# Patient Record
Sex: FEMALE | Race: WHITE | Employment: OTHER | URBAN - METROPOLITAN AREA
[De-identification: names, ages, dates, MRNs, and addresses within clinical notes are randomized per-mention and may not be internally consistent; named-entity substitution may affect disease eponyms.]

---

## 2017-01-05 ENCOUNTER — HOSPITAL ENCOUNTER (OUTPATIENT)
Dept: WOUND CARE | Age: 77
Discharge: HOME | End: 2017-01-05
Payer: MEDICARE

## 2017-01-05 PROCEDURE — 29581 APPL MULTLAYER CMPRN SYS LEG: CPT | Performed by: OTOLARYNGOLOGY

## 2017-01-05 NOTE — PROGRESS NOTES
Jewel [address] [place]   WOUND CARE PROGRESS NOTE       Name:  Emory Joy   MR#:  [id number]   :  1940   Account #:  [de-identified]        Date of Adm:  2017       DATE OF SERVICE:  2017    The patient comes for followup of a venous leg ulcer, I87.312. She has   done well with Aquacel AG and 3-layer wrap. On exam today, she is awake and alert. She is accompanied by a   grandson. The wound continues to improve and is 50% smaller than   last week. We will continue with the Aquacel AG and 3-layer wrap and   see her back in one week.          MD ANTONIO Brito / Landon Coe   D:  2017   16:00   T:  2017   16:20   Job #:  [id number]

## 2017-01-06 ENCOUNTER — OFFICE VISIT (OUTPATIENT)
Dept: NEUROLOGY | Age: 77
End: 2017-01-06

## 2017-01-06 VITALS — HEART RATE: 64 BPM

## 2017-01-06 DIAGNOSIS — G30.1 LATE ONSET ALZHEIMER'S DISEASE WITHOUT BEHAVIORAL DISTURBANCE (HCC): Primary | ICD-10-CM

## 2017-01-06 DIAGNOSIS — F32.A DEPRESSION, UNSPECIFIED DEPRESSION TYPE: ICD-10-CM

## 2017-01-06 DIAGNOSIS — F02.80 LATE ONSET ALZHEIMER'S DISEASE WITHOUT BEHAVIORAL DISTURBANCE (HCC): Primary | ICD-10-CM

## 2017-01-06 RX ORDER — ESCITALOPRAM OXALATE 20 MG/1
20 TABLET ORAL DAILY
Qty: 30 TAB | Refills: 5 | Status: SHIPPED | OUTPATIENT
Start: 2017-01-06 | End: 2017-01-06 | Stop reason: SDUPTHER

## 2017-01-06 RX ORDER — ESCITALOPRAM OXALATE 20 MG/1
TABLET ORAL
Qty: 90 TAB | Refills: 5 | Status: SHIPPED | OUTPATIENT
Start: 2017-01-06

## 2017-01-06 NOTE — MR AVS SNAPSHOT
Visit Information Date & Time Provider Department Dept. Phone Encounter #  
 1/6/2017  2:00 PM Nora Cooney NP [facility] [phone] [id number] Follow-up Instructions Return in about 6 months (around 7/6/2017). Upcoming Health Maintenance Date Due DTaP/Tdap/Td series (1 - Tdap) 12/11/1962 FOBT Q 1 YEAR AGE 50-75 12/11/1991 ZOSTER VACCINE AGE 60> 12/11/2001 GLAUCOMA SCREENING Q2Y 12/11/2006 OSTEOPOROSIS SCREENING (DEXA) 12/11/2006 Pneumococcal 65+ Low/Medium Risk (1 of 2 - PCV13) 12/11/2006 MEDICARE YEARLY EXAM 12/11/2006 INFLUENZA AGE 9 TO ADULT 8/1/2016 Allergies as of 1/6/2017  Review Complete On: 1/6/2017 By: Ling Lea LPN Severity Noted Reaction Type Reactions Eliquis [Apixaban] Medium 12/08/2016    Nausea Only Xarelto [Rivaroxaban] Medium 12/08/2016    Unknown (comments) Amoxicillin  04/11/2016    Other (comments) Current Immunizations  Never Reviewed No immunizations on file. Not reviewed this visit You Were Diagnosed With   
  
 Codes Comments Late onset Alzheimer's disease without behavioral disturbance    -  Primary ICD-10-CM: G30.1, F02.80 ICD-9-CM: 331.0, 294.10 Depression, unspecified depression type     ICD-10-CM: F32.9 ICD-9-CM: 648 Vitals BP Pulse Height(growth percentile) SpO2 OB Status Smoking Status (P) 102/70 64 (P) 5' 3\" (1.6 m) (P) 98% Postmenopausal Never Smoker Vitals History Preferred Pharmacy Pharmacy Name Phone [facility] [address], [address] [place] [address] [phone] Your Updated Medication List  
  
   
This list is accurate as of: 1/6/17  3:08 PM.  Always use your most recent med list.  
  
  
  
  
 atenolol 25 mg tablet Commonly known as:  TENORMIN Take 25 mg by mouth two (2) times a day. escitalopram oxalate 20 mg tablet Commonly known as:  Shan Barrs Take 1 Tab by mouth daily. mesalamine  mg Cpdr DR capsule Commonly known as:  DELZICOL Take  by mouth two (2) times a day. warfarin 5 mg tablet Commonly known as:  COUMADIN Take 5 mg by mouth daily. Prescriptions Sent to Pharmacy Refills  
 escitalopram oxalate (LEXAPRO) 20 mg tablet 5 Sig: Take 1 Tab by mouth daily. Class: Normal  
 Pharmacy: [facility] [address] [facility], [address] [place] [address] Leonor Chávez of [address] [place] [address] Ph #: [phone] Route: Oral  
  
Follow-up Instructions Return in about 6 months (around 7/6/2017). To-Do List   
 01/12/2017 3:15 PM  
  Appointment with Brian Dockery MD at [address] [place] [address]. ([phone]) [facility]! Dear Jose Watkins: Thank you for requesting a [employer] account. Our records indicate that you already have an active [employer] account. You can access your account anytime at [email or web address] Did you know that you can access your hospital and ER discharge instructions at any time in [facility]? You can also review all of your test results from your hospital stay or ER visit. Additional Information If you have questions, please visit the Frequently Asked Questions section of the [employer] website at [email or web address] Remember, [facility] is NOT to be used for urgent needs. For medical emergencies, dial 911. Now available from your iPhone and Android! Please provide this summary of care documentation to your next provider. Your primary care clinician is listed as PROVIDER UNKNOWN. If you have any questions after today's visit, please call [phone].

## 2017-01-06 NOTE — PROGRESS NOTES
Date:             2017    Name:  Jesus Logan  :  1940  MRN:  [id number]     PCP:  PROVIDER UNKNOWN    Chief Complaint   Patient presents with    Follow-up    Dementia     Dementia is worse. HISTORY OF PRESENT ILLNESS:  Derrick Lara is a 77 y.o., female who presents today for follow up for dementia. Daughter had noticed some time ago that her mother was having some memory changes. She had memory testing done in February, son was very concerned at that time and wanted her to be in a nursing home. She moved in with her daughter instead. Patient moved with her daughter to [place], started getting very angry and confused. Told her daughter that her head felt heavy and tangled. Patient moved back home to [place], had aids coming to the home. That didn't work for her because she did not do well with the aids. She is now back with her daughter in [place]. Diagnosis was initially mild dementia, daughter does feel that it has progressed. She sundowns, can be more confused and belligerent at night. Forgets who her daughter is in the mornings, but does well during the day. Daughter feels that she is much better now that she is back in her daughter's home, which she is familiar with. No safety concerns, does not wander, no kitchen safety issues. She does not drive. She has been having increasing trouble finding her words since the summer, daughter thinks that this has been a gradual onset. Has afib, well controlled on coumadin. Does have a history of for 5 or 6 years high cholesterol and has been not on meds for that, BP is well controlled with atenolol. Overall though, she and her daughter seem to be happy with the current arrangement and think that she is stable and safe. Her daughter manages medications, does help with with ADLs, has to remind her to get into the shower, has to make her lunch and leave it for her each day.       2016 recap  Derrick Lara is a 77 y.o. female who I am asked to see in consultation for memory loss. Pt reports that her daughter in law states she can't remember anything and can't go out alone. This was very upsetting to patient. Her daughter that is with her today noted this memory loss started a year ago. She had double hernia surgery a year ago. She had to do rehab at a nursing home and daughter noted that she wasn't the same since. Her PCP started her on aricept. No major changes. She is off aricept x 3 weeks. October last year she had a fall. She lives in [place]. She called her daugther and she flew out. Pt was okay. She has afib and she is on warfarin. She had a cards check and had some meds changed and this has gotten better. She went to see her son in [place] for a few weeks and her son and daughter in law were very concerned. They took her to a neurologist and confirmed dementia but patient didn't feel prepared for that visit. MMSE 27/30 there. Son is very intimidating. He is an oral surgeon and makes mom nervous. She has become even more OCD. [facility]. She is going to have help at home. She is doing all ADLs, cooking, cleaning, etc. She will have someone checking on her. She is only having issues with finances but they have it all autopay now. She has 15 girl friends there and aunt that lives in [place] to help her. She reports feeling less active. Daughter notes since being here she has recovered and livened up a bit. Pt notes she has been having some word finding difficulty. Other son lives in [facility]. Current Outpatient Prescriptions   Medication Sig    escitalopram oxalate (LEXAPRO) 10 mg tablet Take 10 mg by mouth daily.  atenolol (TENORMIN) 25 mg tablet Take 25 mg by mouth two (2) times a day.  warfarin (COUMADIN) 5 mg tablet Take 5 mg by mouth daily.  mesalamine DR (DELZICOL) 400 mg cpDR DR capsule Take  by mouth two (2) times a day.      No current facility-administered medications for this visit. Allergies   Allergen Reactions    Eliquis [Apixaban] Nausea Only    Xarelto [Rivaroxaban] Unknown (comments)    Amoxicillin Other (comments)     Past Medical History   Diagnosis Date    Breast cancer ([facility] [address]      left breast lumpectomy, 2014-no radiation or chemo per daughter Thi Palacios     Past Surgical History   Procedure Laterality Date    Hx gyn      Hx cholecystectomy       Social History     Social History    Marital status: UNKNOWN     Spouse name: N/A    Number of children: N/A    Years of education: N/A     Occupational History    Not on file. Social History Main Topics    Smoking status: Never Smoker    Smokeless tobacco: Not on file    Alcohol use 4.2 oz/week     7 Glasses of wine per week    Drug use: No    Sexual activity: Not on file     Other Topics Concern    Not on file     Social History Narrative     Family History   Problem Relation Age of Onset    Breast Cancer Sister 72         PHYSICAL EXAMINATION:    Visit Vitals    BP (P) 102/70    Pulse 64    Ht (P) 5' 3\" (1.6 m)    SpO2 (P) 98%     General:  Well defined, nourished, and groomed individual in no acute distress. Neck: Supple, nontender, no bruits, no pain with resistance to active range of motion. Heart: Irregular rate and rhythm, no murmurs, rub, or gallop. Normal S1S2. Lungs:  Clear to auscultation bilaterally with equal chest expansion, no cough, no wheeze  Musculoskeletal:  Extremities revealed no edema and had full range of motion of joints. Psych:  Good mood and bright affect    NEUROLOGICAL EXAMINATION:     Mental Status:    Alert. Oriented to self. Not oriented to year, season. Speech is garbled, patient has difficulty finding her words, loses train of thought easily. Cranial Nerves:    II, III, IV, VI:  Visual acuity grossly intact. Pupils are equal, round, and reactive to light. Extra-ocular movements are full and fluid. No ptosis or nystagmus. V-XII: Hearing is grossly intact. Facial features are symmetric, with normal sensation and strength. The palate rises symmetrically and the tongue protrudes midline. Sternocleidomastoids 5/5. Motor Examination: Normal tone, bulk, and strength, 5/5 muscle strength throughout. Coordination:  Finger to nose testing was normal.   No resting or intention tremor  Gait and Station:  Steady while walking and with tandem walking. Normal arm swing. No pronator drift. No muscle wasting or fasciculations noted. ASSESSMENT AND PLAN    ICD-10-CM ICD-9-CM    1. Late onset Alzheimer's disease without behavioral disturbance G30.1 331.0     F02.80 294.10    2. Depression, unspecified depression type F32.9 311 escitalopram oxalate (LEXAPRO) 20 mg tablet     64-year-old female seen in follow-up for dementia. She had neuropsych testing done in [place] earlier this year, unfortunate those results are not available. Her daughter reports that she was diagnosed with mild dementia at that time, does feel that she has progressed. She is living with her daughter. No current safety concerns, no wandering, no driving, no kitchen safety issues. Daughter assists with some ADLs, patient has to be reminded to get in the shower, daughter makes her lunch and leaves it for her daily, manages medications. She does tend to get very frustrated by memory loss, is more tearful or agitated in the evenings. Has extreme difficulty finding her words, this is been progressive since the summer. 1.  Patient did not tolerate Aricept or Namenda  2. Increase lexapro to 20 mg daily for depression, discussed that there are options available should she become increasingly agitated in the evenings but that none of these are really indicated at this time. Family will call if that is the case  3.   Encouraged patient's daughter to look into resources available in the Alzheimer's Association website, they are interested in  to keep her engaged during the day  4. Discussed that there are in-home options available when needed, daughter will call if she feels that they are needed more help in the home. Would consider encompass memory care assessment  5. Discussed possibility that patient has had a stroke with history of A. fib and relatively sudden onset of word finding difficulty, they declined an MRI, will continue to follow with cardiologist for management of A. fib.   Plan to establish care with a new PCP for management of other risk factors, patient should get cholesterol checked    Follow-up in 6 months, call sooner with concerns    40+ minutes, >50% discussing above/answering questions/formulating plan    Yaneth Bernstein NP

## 2017-01-12 ENCOUNTER — HOSPITAL ENCOUNTER (OUTPATIENT)
Dept: WOUND CARE | Age: 77
Discharge: HOME | End: 2017-01-12

## 2017-06-09 ENCOUNTER — APPOINTMENT (OUTPATIENT)
Dept: CT IMAGING | Age: 77
End: 2017-06-09
Attending: PHYSICIAN ASSISTANT
Payer: MEDICARE

## 2017-06-09 ENCOUNTER — HOSPITAL ENCOUNTER (EMERGENCY)
Age: 77
Discharge: HOME | End: 2017-06-09
Attending: EMERGENCY MEDICINE
Payer: MEDICARE

## 2017-06-09 VITALS
TEMPERATURE: 98.1 F | OXYGEN SATURATION: 98 % | HEIGHT: 63 IN | DIASTOLIC BLOOD PRESSURE: 89 MMHG | HEART RATE: 67 BPM | BODY MASS INDEX: 31.01 KG/M2 | RESPIRATION RATE: 18 BRPM | SYSTOLIC BLOOD PRESSURE: 142 MMHG | WEIGHT: 175 LBS

## 2017-06-09 DIAGNOSIS — R82.71 BACTERIURIA: Primary | ICD-10-CM

## 2017-06-09 LAB
ALBUMIN SERPL BCP-MCNC: 3.4 G/DL (ref 3.5–5)
ALBUMIN/GLOB SERPL: 0.9 {RATIO} (ref 1.1–2.2)
ALP SERPL-CCNC: 96 U/L (ref 45–117)
ALT SERPL-CCNC: 18 U/L (ref 12–78)
ANION GAP BLD CALC-SCNC: 5 MMOL/L (ref 5–15)
APPEARANCE UR: CLEAR
AST SERPL W P-5'-P-CCNC: 19 U/L (ref 15–37)
BACTERIA URNS QL MICRO: ABNORMAL /HPF
BASOPHILS # BLD AUTO: 0 K/UL (ref 0–0.1)
BASOPHILS # BLD: 0 % (ref 0–1)
BILIRUB SERPL-MCNC: 0.7 MG/DL (ref 0.2–1)
BILIRUB UR QL: NEGATIVE
BUN SERPL-MCNC: 12 MG/DL (ref 6–20)
BUN/CREAT SERPL: 19 (ref 12–20)
CALCIUM SERPL-MCNC: 8.7 MG/DL (ref 8.5–10.1)
CHLORIDE SERPL-SCNC: 104 MMOL/L (ref 97–108)
CK MB CFR SERPL CALC: 0.3 % (ref 0–2.5)
CK MB SERPL-MCNC: 1.2 NG/ML (ref 5–25)
CK SERPL-CCNC: 354 U/L (ref 26–192)
CO2 SERPL-SCNC: 31 MMOL/L (ref 21–32)
COLOR UR: ABNORMAL
CREAT SERPL-MCNC: 0.62 MG/DL (ref 0.55–1.02)
EOSINOPHIL # BLD: 0.1 K/UL (ref 0–0.4)
EOSINOPHIL NFR BLD: 2 % (ref 0–7)
EPITH CASTS URNS QL MICRO: ABNORMAL /LPF
ERYTHROCYTE [DISTWIDTH] IN BLOOD BY AUTOMATED COUNT: 15 % (ref 11.5–14.5)
GLOBULIN SER CALC-MCNC: 3.7 G/DL (ref 2–4)
GLUCOSE SERPL-MCNC: 93 MG/DL (ref 65–100)
GLUCOSE UR STRIP.AUTO-MCNC: NEGATIVE MG/DL
HCT VFR BLD AUTO: 40.7 % (ref 35–47)
HGB BLD-MCNC: 13.7 G/DL (ref 11.5–16)
HGB UR QL STRIP: ABNORMAL
INR PPP: 2.6 (ref 0.9–1.1)
KETONES UR QL STRIP.AUTO: NEGATIVE MG/DL
LEUKOCYTE ESTERASE UR QL STRIP.AUTO: NEGATIVE
LIPASE SERPL-CCNC: 173 U/L (ref 73–393)
LYMPHOCYTES # BLD AUTO: 27 % (ref 12–49)
LYMPHOCYTES # BLD: 1.8 K/UL (ref 0.8–3.5)
MCH RBC QN AUTO: 31.7 PG (ref 26–34)
MCHC RBC AUTO-ENTMCNC: 33.7 G/DL (ref 30–36.5)
MCV RBC AUTO: 94.2 FL (ref 80–99)
MONOCYTES # BLD: 0.6 K/UL (ref 0–1)
MONOCYTES NFR BLD AUTO: 9 % (ref 5–13)
NEUTS SEG # BLD: 4 K/UL (ref 1.8–8)
NEUTS SEG NFR BLD AUTO: 62 % (ref 32–75)
NITRITE UR QL STRIP.AUTO: NEGATIVE
PH UR STRIP: 6 [PH] (ref 5–8)
PLATELET # BLD AUTO: 205 K/UL (ref 150–400)
POTASSIUM SERPL-SCNC: 4 MMOL/L (ref 3.5–5.1)
PROT SERPL-MCNC: 7.1 G/DL (ref 6.4–8.2)
PROT UR STRIP-MCNC: NEGATIVE MG/DL
PROTHROMBIN TIME: 26.5 SEC (ref 9–11.1)
RBC # BLD AUTO: 4.32 M/UL (ref 3.8–5.2)
RBC #/AREA URNS HPF: ABNORMAL /HPF (ref 0–5)
SODIUM SERPL-SCNC: 140 MMOL/L (ref 136–145)
SP GR UR REFRACTOMETRY: 1.02 (ref 1–1.03)
TROPONIN I SERPL-MCNC: <0.04 NG/ML
UA: UC IF INDICATED,UAUC: ABNORMAL
UROBILINOGEN UR QL STRIP.AUTO: 0.2 EU/DL (ref 0.2–1)
WBC # BLD AUTO: 6.5 K/UL (ref 3.6–11)
WBC URNS QL MICRO: ABNORMAL /HPF (ref 0–4)

## 2017-06-09 PROCEDURE — 85610 PROTHROMBIN TIME: CPT | Performed by: EMERGENCY MEDICINE

## 2017-06-09 PROCEDURE — 84484 ASSAY OF TROPONIN QUANT: CPT | Performed by: EMERGENCY MEDICINE

## 2017-06-09 PROCEDURE — 93005 ELECTROCARDIOGRAM TRACING: CPT

## 2017-06-09 PROCEDURE — 82550 ASSAY OF CK (CPK): CPT | Performed by: EMERGENCY MEDICINE

## 2017-06-09 PROCEDURE — 80053 COMPREHEN METABOLIC PANEL: CPT | Performed by: EMERGENCY MEDICINE

## 2017-06-09 PROCEDURE — 36415 COLL VENOUS BLD VENIPUNCTURE: CPT | Performed by: EMERGENCY MEDICINE

## 2017-06-09 PROCEDURE — 83690 ASSAY OF LIPASE: CPT | Performed by: PHYSICIAN ASSISTANT

## 2017-06-09 PROCEDURE — 99283 EMERGENCY DEPT VISIT LOW MDM: CPT

## 2017-06-09 PROCEDURE — 85025 COMPLETE CBC W/AUTO DIFF WBC: CPT | Performed by: EMERGENCY MEDICINE

## 2017-06-09 PROCEDURE — 87086 URINE CULTURE/COLONY COUNT: CPT | Performed by: PHYSICIAN ASSISTANT

## 2017-06-09 PROCEDURE — 81001 URINALYSIS AUTO W/SCOPE: CPT | Performed by: PHYSICIAN ASSISTANT

## 2017-06-09 RX ORDER — CEPHALEXIN 500 MG/1
500 CAPSULE ORAL 3 TIMES DAILY
Qty: 9 CAP | Refills: 0 | Status: SHIPPED | OUTPATIENT
Start: 2017-06-09 | End: 2017-06-12

## 2017-06-09 RX ORDER — SODIUM CHLORIDE 9 MG/ML
50 INJECTION, SOLUTION INTRAVENOUS
Status: DISCONTINUED | OUTPATIENT
Start: 2017-06-09 | End: 2017-06-09

## 2017-06-09 RX ORDER — SODIUM CHLORIDE 0.9 % (FLUSH) 0.9 %
5-10 SYRINGE (ML) INJECTION AS NEEDED
Status: DISCONTINUED | OUTPATIENT
Start: 2017-06-09 | End: 2017-06-09

## 2017-06-09 RX ORDER — SODIUM CHLORIDE 0.9 % (FLUSH) 0.9 %
10 SYRINGE (ML) INJECTION
Status: DISCONTINUED | OUTPATIENT
Start: 2017-06-09 | End: 2017-06-09

## 2017-06-09 NOTE — ED NOTES
Pt arrives ambulatory to room. Pt has hx of alzheimers with no short term memory but pt daughter reports that her mother came to her sons room and c/o chest pain around 3 pm today. Per daughter pt doesn't often remember who the son is but came to him with the complaint which is unusual. Pt denies n/v or pain at this time. Call bell within reach and plan of care discussed.

## 2017-06-09 NOTE — ED PROVIDER NOTES
HPI Comments: Dena Jeffries is a 77 y.o. female with PMHx significant for A-fib, ulcerative colitis, alzheimer's and left breast cancer presenting ambulatory to [id number] [place] [address] ED with c/o substernal chest pain/epigastric pain that began at around 15:00 today but has since subsided on its own. Pt's history is provided by the pt's daughter who reports that the pt walked over to her grandson's room today to inform him that her chest was hurting. She notes that the pt has a Hx of alzheimer's, struggles with short term memory and does not often even remember who her grandson is. She denies the pt having ever made similar complaints of chest pain before. She states that the pt also appeared weaker and was slurring her speech more than usual earlier today. She notes that she was also having difficulty swallowing. She denies the pt having a fever, nausea, vomiting, having eaten anything today, experiencing any pain currently or increased swelling in her legs. PCP: None  Cardiology: Dr. Bina Blackwell  Vascular Surgery: Dr. Nuno Galeas  PSHx: cholecystectomy   Social History:  (-) Tobacco,   (+) EtOH,      (-) Drugs     There are no other complaints, changes, or physical findings at this time. The history is provided by the patient and a relative. Past Medical History:   Diagnosis Date    Breast cancer [facility])     left breast lumpectomy, 2014-no radiation or chemo per daughter [facility]    Dementia     alzheimers    Gastrointestinal disorder     ulcerative colitis    Ill-defined condition     a fib       Past Surgical History:   Procedure Laterality Date    HX CHOLECYSTECTOMY      HX GYN           Family History:   Problem Relation Age of Onset    Breast Cancer Sister 72       Social History     Social History    Marital status: UNKNOWN     Spouse name: N/A    Number of children: N/A    Years of education: N/A     Occupational History    Not on file.      Social History Main Topics    Smoking status: Never Smoker    Smokeless tobacco: Not on file    Alcohol use 4.2 oz/week     7 Glasses of wine per week    Drug use: No    Sexual activity: Not on file     Other Topics Concern    Not on file     Social History Narrative         ALLERGIES: Eliquis [apixaban]; Xarelto [rivaroxaban]; and Amoxicillin    Review of Systems   Constitutional: Negative for fatigue and fever. HENT: Negative for ear pain and sore throat. Eyes: Negative for pain, redness and visual disturbance. Respiratory: Negative for cough and shortness of breath. Cardiovascular: Positive for chest pain. Negative for palpitations. Gastrointestinal: Positive for abdominal pain. Negative for nausea and vomiting. Genitourinary: Negative for dysuria, frequency and urgency. Musculoskeletal: Negative for back pain, gait problem, neck pain and neck stiffness. Skin: Negative for rash and wound. Neurological: Positive for speech difficulty and weakness. Negative for dizziness, light-headedness, numbness and headaches. All other systems reviewed and are negative. Vitals:    06/09/17 1551 06/09/17 1814   BP: 142/89    Pulse: 67    Resp: 18    Temp: 98.1 °F (36.7 °C)    SpO2: 98% 98%   Weight: 79.4 kg (175 lb)    Height: 5' 3\" (1.6 m)             Physical Exam   Constitutional: She is oriented to person, place, and time. She appears well-developed and well-nourished. Non-toxic appearance. No distress. HENT:   Head: Normocephalic and atraumatic. Right Ear: External ear normal.   Left Ear: External ear normal.   Nose: Nose normal.   Mouth/Throat: Uvula is midline. No trismus in the jaw. Eyes: Conjunctivae and EOM are normal. Pupils are equal, round, and reactive to light. No scleral icterus. Neck: Normal range of motion and full passive range of motion without pain. Cardiovascular: Normal rate and normal heart sounds. An irregularly irregular rhythm present.    Pulmonary/Chest: Effort normal and breath sounds normal. No accessory muscle usage. No tachypnea. No respiratory distress. She has no decreased breath sounds. She has no wheezes. Abdominal: Soft. There is no tenderness. Musculoskeletal: Normal range of motion. Neurological: She is alert and oriented to person, place, and time. She is not disoriented. No cranial nerve deficit. GCS eye subscore is 4. GCS verbal subscore is 5. GCS motor subscore is 6. Skin: Skin is intact. No rash noted. Psychiatric: She has a normal mood and affect. Her speech is normal.   Nursing note and vitals reviewed. MDM  Number of Diagnoses or Management Options  Bacteriuria:   Diagnosis management comments:   DDx includes hepatitis, ACS, pancreatitis, cholecystitis, appendicitis, diverticulitis, obstruction, UTI, pyelonephritis, gastroenteritis, gastritis. Though some of these considerations can be excluded by history and physical exam, others may require additional testing such as laboratory and imaging. Amount and/or Complexity of Data Reviewed  Clinical lab tests: ordered and reviewed  Tests in the medicine section of CPT®: ordered and reviewed  Obtain history from someone other than the patient: yes (Pt's daughter)  Review and summarize past medical records: yes  Independent visualization of images, tracings, or specimens: yes    Patient Progress  Patient progress: stable    ED Course       Procedures    7:41 PM  Pt has a benign, non-surgical, soft abdomen. Discussed the pt's lab results with the pt and her family. We all agree that it's safe and appropriate to discontinue the CT of the pt's abdomen. 7:44 PM  Bianca Ruiz's final results have been reviewed with her and her family. They have been counseled regarding her diagnosis. They verbally convey understanding and agreement of the signs, symptoms, diagnosis, treatment and prognosis .      LABORATORY TESTS:  Patient Vitals for the past 12 hrs:   Temp Pulse Resp BP SpO2   06/09/17 1814 - - - - 98 %   06/09/17 1551 98.1 °F (36.7 °C) 67 18 142/89 98 %     MEDICATIONS GIVEN:  Medications - No data to display    IMPRESSION:  1. Bacteriuria        PLAN:  1. Discharge Medication List as of 6/9/2017  7:51 PM      START taking these medications    Details   cephALEXin (KEFLEX) 500 mg capsule Take 1 Cap by mouth three (3) times daily for 3 days. , Print, Disp-9 Cap, R-0         CONTINUE these medications which have NOT CHANGED    Details   escitalopram oxalate (LEXAPRO) 20 mg tablet TAKE 1 TABLET BY MOUTH DAILY, Normal**Patient requests 90 days supply**Disp-90 Tab, R-5      atenolol (TENORMIN) 25 mg tablet Take 25 mg by mouth two (2) times a day., Historical Med      warfarin (COUMADIN) 5 mg tablet Take 5 mg by mouth daily. , Historical Med      mesalamine DR (DELZICOL) 400 mg cpDR DR capsule Take  by mouth two (2) times a day., Historical Med           2. Follow-up Information     Follow up With Details Comments Contact Info    Your Primary Care Schedule an appointment as soon as possible for a visit As needed         Return to ED if worse     DISCHARGE NOTE  7:45 PM  The patient has been re-evaluated and is ready for discharge. Reviewed available results with patient. Counseled pt on diagnosis and care plan. Pt has expressed understanding, and all questions have been answered. Pt agrees with plan and agrees to F/U as recommended, or return to the ED if their sxs worsen. Discharge instructions have been provided and explained to the pt, along with reasons to return to the ED. This note is prepared by Dakota Crespo, acting as Scribe for Sukhwinder Sims: The scribe's documentation has been prepared under my direction and personally reviewed by me in its entirety. I confirm that the note above accurately reflects all work, treatment, procedures, and medical decision making performed by me.

## 2017-06-09 NOTE — ED NOTES
Ahmet verbally discharged patient from ED to home at this time. Discharge paperwork, medications, and follow up care education performed.

## 2017-06-09 NOTE — DISCHARGE INSTRUCTIONS
Thank you for allowing us to provide you with care today. We hope we addressed all of your concerns and needs. We strive to provide excellent quality care in the Emergency Department. Please rate us as excellent, as anything less than excellent does not meet our expectations. If you feel that you have not received excellent quality care or timely care, please ask to speak to the nurse manager. Please choose us in the future for your continued health care needs. The exam and treatment you received in the Emergency Department were for an urgent problem and are not intended as complete care. It is important that you follow-up with a doctor, nurse practitioner, or  [id number] assistant to: (1) confirm your diagnosis, (2) re-evaluation of changes in your illness and treatment, and (3) for ongoing care. If your symptoms become worse or you do not improve as expected and you are unable to reach your usual health care provider, you should return to the Emergency Department. We are available 24 hours a day. Take this sheet with you when you go to your follow-up visit. If you have any problem arranging the follow-up visit, contact the Emergency Department immediately. Make an appointment with your Primary Care doctor for follow up of this visit. Return to the ER if you are unable to be seen in the time recommended on your discharge instructions.

## 2017-06-10 LAB
ATRIAL RATE: 69 BPM
CALCULATED R AXIS, ECG10: 22 DEGREES
CALCULATED T AXIS, ECG11: 28 DEGREES
DIAGNOSIS, 93000: NORMAL
Q-T INTERVAL, ECG07: 420 MS
QRS DURATION, ECG06: 86 MS
QTC CALCULATION (BEZET), ECG08: 443 MS
VENTRICULAR RATE, ECG03: 67 BPM

## 2017-06-11 LAB
BACTERIA SPEC CULT: NORMAL
CC UR VC: NORMAL
SERVICE CMNT-IMP: NORMAL

## 2019-02-22 ENCOUNTER — HOSPITAL ENCOUNTER (OUTPATIENT)
Dept: MAMMOGRAPHY | Age: 79
Discharge: HOME | End: 2019-02-22
Attending: FAMILY MEDICINE
Payer: MEDICARE

## 2019-02-22 DIAGNOSIS — R92.8 ABNORMAL MAMMOGRAM: ICD-10-CM

## 2019-02-22 PROCEDURE — 77066 DX MAMMO INCL CAD BI: CPT

## 2019-02-25 ENCOUNTER — TELEPHONE (OUTPATIENT)
Dept: MAMMOGRAPHY | Age: 79
End: 2019-02-25

## 2019-02-26 ENCOUNTER — TELEPHONE (OUTPATIENT)
Dept: MAMMOGRAPHY | Age: 79
End: 2019-02-26

## 2019-02-27 NOTE — PROGRESS NOTES
To whom it may concern:    Our common patient, Renu Thao (1940) anticipates a breast biopsy soon. She may hold her warfarin for 3 days prior to the procedure and resume when appropriate post-procedure as soon as able. This is a low risk procedure from a cardiac standpoint.     Signed By: Jonel Gama MD     February 26, 2019

## 2019-03-04 ENCOUNTER — HOSPITAL ENCOUNTER (OUTPATIENT)
Dept: MAMMOGRAPHY | Age: 79
Discharge: HOME | End: 2019-03-04
Attending: FAMILY MEDICINE
Payer: MEDICARE

## 2019-03-04 VITALS — RESPIRATION RATE: 20 BRPM

## 2019-03-04 DIAGNOSIS — R92.8 ABNORMAL MAMMOGRAM OF LEFT BREAST: ICD-10-CM

## 2019-03-04 DIAGNOSIS — R92.8 ABNORMAL MAMMOGRAM: ICD-10-CM

## 2019-03-04 PROCEDURE — 77065 DX MAMMO INCL CAD UNI: CPT

## 2019-03-04 PROCEDURE — 77030013689 HC GD NDL EVIVA HOLO -A

## 2019-03-04 PROCEDURE — 77030030538 HC HNDPC BIOP EVIVA HOLO -C

## 2019-03-04 PROCEDURE — 88305 TISSUE EXAM BY PATHOLOGIST: CPT

## 2019-03-04 PROCEDURE — 74011250636 HC RX REV CODE- 250/636: Performed by: RADIOLOGY

## 2019-03-04 PROCEDURE — A4648 IMPLANTABLE TISSUE MARKER: HCPCS

## 2019-03-04 PROCEDURE — 74011000250 HC RX REV CODE- 250: Performed by: RADIOLOGY

## 2019-03-04 PROCEDURE — 19081 BX BREAST 1ST LESION STRTCTC: CPT

## 2019-03-04 RX ORDER — MEMANTINE HYDROCHLORIDE 10 MG/1
10 TABLET ORAL DAILY
COMMUNITY

## 2019-03-04 RX ORDER — LIDOCAINE HYDROCHLORIDE AND EPINEPHRINE 10; 10 MG/ML; UG/ML
8 INJECTION, SOLUTION INFILTRATION; PERINEURAL ONCE
Status: DISCONTINUED | OUTPATIENT
Start: 2019-03-04 | End: 2019-03-04

## 2019-03-04 RX ORDER — LIDOCAINE HYDROCHLORIDE 10 MG/ML
12 INJECTION, SOLUTION EPIDURAL; INFILTRATION; INTRACAUDAL; PERINEURAL
Status: COMPLETED | OUTPATIENT
Start: 2019-03-04 | End: 2019-03-04

## 2019-03-04 RX ORDER — DONEPEZIL HYDROCHLORIDE 10 MG/1
10 TABLET, FILM COATED ORAL 2 TIMES DAILY
COMMUNITY

## 2019-03-04 RX ADMIN — SODIUM BICARBONATE 2 ML: 0.2 INJECTION, SOLUTION INTRAVENOUS at 12:00

## 2019-03-04 RX ADMIN — LIDOCAINE HYDROCHLORIDE 20 ML: 10 INJECTION, SOLUTION EPIDURAL; INFILTRATION; INTRACAUDAL; PERINEURAL at 12:00

## 2019-03-04 NOTE — DISCHARGE INSTRUCTIONS
Instructions Following Your Breast Biopsy         Pain Control    · Tylenol should be taken as directed on the back of the bottle (crispin 4-6 hours) for the next 24 hours post breast biopsy unless directed otherwise by a physician. · No Aspirin or Anti-Inflammatory  (Motrin, Advil ) medications for 24 hours. · Wearing a soft, comfortable (Wire free ) bra, even at night, for 24 hours is helpful. · Use a clean, covered ice pack over the site every 1- 2 hours til bedtime, for 20-30 minutes at a time for the first 24 hours. Biopsy Site     · A small amount of bleeding and /or oozing from the Biopsy site is normal, as well as bloody nipple discharge, which is rare. · You may notice bruising on the skin over the next few days. · Steri Strips and a dressing have been applied. · The steri strips can remain on for up to 5 days. · The clean dressing can be removed in 48 hours, however, if the dressing becomes loose, causes redness or irritation of the skin or any drainage has collected, please remove it an replace it with a Band-Aid. · Avoid getting the Biopsy site wet for 48 hours, avoid showering, swimming and hot tubs. · Should you have excessive bleeding or pain, please seek medical treatment or call                 [address] [phone], 7:30 - 4:00 p.m. After hours (ask to speak to the on-call Radiology Nurse-RN)                        [phone] or [phone]      Activity      · Rest the day of the biopsy, avoiding strenuous activities and any heavy lifting. · You may resume most activities/ work the following day. Pathology Report     · The results of your Pathology report, from the laboratory, should be available in 3-5 business days. The Radiologist will review your results and, based on your preference, we will be happy to provide results to you by phone or in person.   · You will also be advised, at that time, of any further appointments or follow- up you may need.

## 2019-03-04 NOTE — ROUTINE PROCESS
Computer not working, updated in computer when working. 5 - Dr. Yolanda Mcmillan Present for pre procedure consult and consent - questions reviewed with understanding - consent signed. Discharge instructions reviewed w/daughter advising she is very familiar with discharge instructions. Daughter is medical POA and cares for Ms. Ruiz 24/7. Ms. Renee Valenzuela has significant history of dementia so daughter will remain at bedside to refamiliar to staying still and following commands. 1118 - start time  1208 - collection time  1210 - end time  1212 - container time  1235 - to mammo for post film  1305 - discharged pt to home w/daughter. Discharge instructions reviewed with understanding, questions reviewed, copy given to Courtney Mitchell, pt's daughter. Post procedure Mammo completed and reviewed MD, pt cleared for discharge. Biopsy site clean and dry, hemostasis achieved. Steri strips with DSD placed. Ice pack held by patient garment. Daughter verbalized she would like to be notified by phone of any results. Daughter encouraged to call department if she has not received her results in 3-5 business days. Daughter advised not answer cell phone call while driving. Specimen carried to gross room by RN. Verified specimen w/Amairani prior to take to gross room.

## 2019-03-06 NOTE — PROGRESS NOTES
Called and spoke with Dr. Krys Weir regarding pt.'s pathology report. Dr. Krys Weir reviewed pathology result and recommended her to continue her yearly mammograms. The pt's daughter, Sandor Lion, was notified of the benign results and recommendations for a follow up mammogram in 1 year. Advised pt to follow up with her doctor for any changes.

## 2019-03-11 ENCOUNTER — TELEPHONE (OUTPATIENT)
Dept: MAMMOGRAPHY | Age: 79
End: 2019-03-11

## 2019-03-11 NOTE — TELEPHONE ENCOUNTER
Received call from chiqui Virk.'s daughter, stating pt. Is still having continual pain mid sternum to left lower breast (rib area). Daughter stating pain is not easing up even with lidocaine, bengay, positioning (pt will not let her daughter place ice pack on site). Sully, pt.'s daughter, states she noted 2 nodules in area of discomfort since Saturday evening when giving her mother a shower. Encouraged Sully to call Dr. Marilee Fontenot office and see what the next plan of care may be (xray, CT of her chest, etc). Sully stating she will do that and encouraged her to return call at any time for any further questions/concerns regarding her mothers procedure on 3/4/2019.

## 2021-01-28 ENCOUNTER — VIRTUAL VISIT (OUTPATIENT)
Dept: NEUROLOGY | Age: 81
End: 2021-01-28
Payer: MEDICARE

## 2021-01-28 DIAGNOSIS — G40.409 MYOCLONIC EPILEPSY (HCC): ICD-10-CM

## 2021-01-28 DIAGNOSIS — R29.6 FREQUENT FALLS: ICD-10-CM

## 2021-01-28 DIAGNOSIS — F03.90 DEMENTIA WITHOUT BEHAVIORAL DISTURBANCE, UNSPECIFIED DEMENTIA TYPE: Primary | ICD-10-CM

## 2021-01-28 DIAGNOSIS — G25.3 MYOCLONIC JERKING: ICD-10-CM

## 2021-01-28 DIAGNOSIS — E56.9 HYPOVITAMINOSIS: ICD-10-CM

## 2021-01-28 PROCEDURE — G8400 PT W/DXA NO RESULTS DOC: HCPCS | Performed by: PSYCHIATRY & NEUROLOGY

## 2021-01-28 PROCEDURE — G8432 DEP SCR NOT DOC, RNG: HCPCS | Performed by: PSYCHIATRY & NEUROLOGY

## 2021-01-28 PROCEDURE — G8536 NO DOC ELDER MAL SCRN: HCPCS | Performed by: PSYCHIATRY & NEUROLOGY

## 2021-01-28 PROCEDURE — 1090F PRES/ABSN URINE INCON ASSESS: CPT | Performed by: PSYCHIATRY & NEUROLOGY

## 2021-01-28 PROCEDURE — G8427 DOCREV CUR MEDS BY ELIG CLIN: HCPCS | Performed by: PSYCHIATRY & NEUROLOGY

## 2021-01-28 PROCEDURE — 1101F PT FALLS ASSESS-DOCD LE1/YR: CPT | Performed by: PSYCHIATRY & NEUROLOGY

## 2021-01-28 PROCEDURE — G8421 BMI NOT CALCULATED: HCPCS | Performed by: PSYCHIATRY & NEUROLOGY

## 2021-01-28 PROCEDURE — 99205 OFFICE O/P NEW HI 60 MIN: CPT | Performed by: PSYCHIATRY & NEUROLOGY

## 2021-01-28 RX ORDER — LEVETIRACETAM 100 MG/ML
10 SOLUTION ORAL
Qty: 120 ML | Refills: 1 | Status: SHIPPED | OUTPATIENT
Start: 2021-01-28

## 2021-01-28 RX ORDER — RIVASTIGMINE 9.5 MG/24H
1 PATCH, EXTENDED RELEASE TRANSDERMAL DAILY
COMMUNITY

## 2021-01-28 NOTE — PROGRESS NOTES
Neurology Consult Note  Shelley Gallo was seen by synchronous (real-time) audio-video technology on 01/28/21. Consent:  She and/or her healthcare decision maker is aware that this patient-initiated Telehealth encounter is a billable service, with coverage as determined by her insurance carrier. She is aware that she may receive a bill and has provided verbal consent to proceed: Yes    I was in the office while conducting this encounter. Pursuant to the emergency declaration under the [facility] [place] [address] waiver authority and the [employer] Act, this Virtual  Visit was conducted, with patient's consent, to reduce the patient's risk of exposure to COVID-19 and provide continuity of care for an established patient. Services were provided through a video synchronous discussion virtually to substitute for in-person clinic visit. HISTORY PROVIDED BY:daughter-Sandra    Chief Complaint:   Chief Complaint   Patient presents with    New Patient     last month started jerk movements    Memory Loss      Subjective:    Shelley Gallo is a 79 y.o. right handed female who presents in consultation for memory loss and jerking movement  This is a 60-year-old right-handed white female  with history of breast cancer, left lumpectomy, dementia possible Alzheimer's type, ulcerative colitis, A. fib, who was referred to the clinic to evaluate for dementia and jerking movement. History was entirely obtained from patient's daughter-Sandra as patient was unable to provide any history. According to patient's daughter, patient was diagnosed with Alzheimer's dementia about 7 years ago apparently  considered advanced Patient is said to have been unable to verbalize, however, have been eating and sleeping well.   He has been unable to take care of activities of daily living according to daughter, because of that,  her daughter has to take  off from  her job, went part-time in order to take care of mother 24/7. Patient has not been having any outbursts. Daughter says the condition progressed so much so that patient's speech decreased but she was able to understand. In the last couple of months, patient started having jerking movement which occurs mostly in the early morning but he has increased to the daytime and patient started dropping things on falling. Daughter says patient comes walking and the jerking movement starts at which time patient will fall, if she is holding something, the same will fly out of her hand. She recorded one of the episodes and showed that to me. Because of the  new development, neurology consultation was generated.   Review of Systems - History obtained from daughter and unobtainable from patient due to mental status  General ROS: positive for  - fatigue and sleep disturbance  Psychological ROS: positive for - anxiety and sleep disturbances  Ophthalmic ROS: negative for - blurry vision or decreased vision  ENT ROS: negative for - headaches, tinnitus, vertigo or visual changes  Allergy and Immunology ROS: negative  Hematological and Lymphatic ROS: negative  Endocrine ROS: negative  Respiratory ROS: no cough, shortness of breath, or wheezing  Cardiovascular ROS: no chest pain or dyspnea on exertion  Gastrointestinal ROS: no abdominal pain, change in bowel habits, or black or bloody stools  Genito-Urinary ROS: no dysuria, trouble voiding, or hematuria  Musculoskeletal ROS: positive for - gait disturbance, joint pain, joint stiffness, muscle pain and muscular weakness  Neurological ROS: positive for - confusion, gait disturbance, seizures, speech problems and weakness  Dermatological ROS: negative    Past Medical History:   Diagnosis Date    [facility]     left breast lumpectomy, 2014-no radiation or chemo per daughter Faye Rodriguez Dementia     alzheimers    Gastrointestinal disorder     ulcerative colitis    Ill-defined condition     a fib      Past Surgical History:   Procedure Laterality Date    HX BREAST BIOPSY Left 2014    breast    HX BREAST LUMPECTOMY Left 2014    HX CHOLECYSTECTOMY      HX GYN        Social History     Socioeconomic History    Marital status:      Spouse name: Not on file    Number of children: Not on file    Years of education: Not on file    Highest education level: Not on file   Occupational History    Not on file   Social Needs    Financial resource strain: Not on file    Food insecurity     Worry: Not on file     Inability: Not on file    Transportation needs     Medical: Not on file     Non-medical: Not on file   Tobacco Use    Smoking status: Never Smoker   Substance and Sexual Activity    Alcohol use:  Yes     Alcohol/week: 7.0 standard drinks     Types: 7 Glasses of wine per week    Drug use: No    Sexual activity: Not on file   Lifestyle    Physical activity     Days per week: Not on file     Minutes per session: Not on file    Stress: Not on file   Relationships    Social connections     Talks on phone: Not on file     Gets together: Not on file     Attends Denominational service: Not on file     Active member of club or organization: Not on file     Attends meetings of clubs or organizations: Not on file     Relationship status: Not on file    Intimate partner violence     Fear of current or ex partner: Not on file     Emotionally abused: Not on file     Physically abused: Not on file     Forced sexual activity: Not on file   Other Topics Concern    Not on file   Social History Narrative    Not on file     Family History   Problem Relation Age of Onset    Breast Cancer Sister 72        [place]         Objective:   ROS  As per HPI  Allergies   Allergen Reactions    Eliquis [Apixaban] Nausea Only    Xarelto [Rivaroxaban] Unknown (comments)    Amoxicillin Other (comments)    Flagyl [Metronidazole] Hives        Meds:  Outpatient Medications Prior to Visit Medication Sig Dispense Refill    rivastigmine (EXELON) 9.5 mg/24 hr patch 1 Patch by TransDERmal route daily.  atenolol (TENORMIN) 25 mg tablet Take 25 mg by mouth two (2) times a day.  warfarin (COUMADIN) 5 mg tablet Take 5 mg by mouth daily.  memantine (NAMENDA) 10 mg tablet Take 10 mg by mouth daily.  donepezil (ARICEPT) 10 mg tablet Take 10 mg by mouth two (2) times a day.  escitalopram oxalate (LEXAPRO) 20 mg tablet TAKE 1 TABLET BY MOUTH DAILY 90 Tab 5    mesalamine DR (DELZICOL) 400 mg cpDR DR capsule Take  by mouth two (2) times a day. No facility-administered medications prior to visit. Imaging:  MRI Results (most recent):  Results from Abstract encounter on 05/05/16   MRA BRAIN WO CONT      CT Results (most recent):  Results from Abstract encounter on 05/05/16   CT HEAD WO CONT        Reviewed records in [facility] and media tab today    Lab Review   Results for orders placed or performed during the hospital encounter of 06/09/17   CULTURE, URINE    Specimen: Urine   Result Value Ref Range    Special Requests: NO SPECIAL REQUESTS  Reflexed from [id number]        Price Count >100,000  COLONIES/mL        Culture result: MIXED UROGENITAL ILDEFONSO ISOLATED     METABOLIC PANEL, COMPREHENSIVE   Result Value Ref Range    Sodium 140 136 - 145 mmol/L    Potassium 4.0 3.5 - 5.1 mmol/L    Chloride 104 97 - 108 mmol/L    CO2 31 21 - 32 mmol/L    Anion gap 5 5 - 15 mmol/L    Glucose 93 65 - 100 mg/dL    BUN 12 6 - 20 MG/DL    Creatinine 0.62 0.55 - 1.02 MG/DL    BUN/Creatinine ratio 19 12 - 20      GFR est AA >60 >60 ml/min/1.73m2    GFR est non-AA >60 >60 ml/min/1.73m2    Calcium 8.7 8.5 - 10.1 MG/DL    Bilirubin, total 0.7 0.2 - 1.0 MG/DL    ALT (SGPT) 18 12 - 78 U/L    AST (SGOT) 19 15 - 37 U/L    Alk.  phosphatase 96 45 - 117 U/L    Protein, total 7.1 6.4 - 8.2 g/dL    Albumin 3.4 (L) 3.5 - 5.0 g/dL    Globulin 3.7 2.0 - 4.0 g/dL    A-G Ratio 0.9 (L) 1.1 - 2.2     CBC WITH AUTOMATED DIFF   Result Value Ref Range    WBC 6.5 3.6 - 11.0 K/uL    RBC 4.32 3.80 - 5.20 M/uL    HGB 13.7 11.5 - 16.0 g/dL    HCT 40.7 35.0 - 47.0 %    MCV 94.2 80.0 - 99.0 FL    MCH 31.7 26.0 - 34.0 PG    MCHC 33.7 30.0 - 36.5 g/dL    RDW 15.0 (H) 11.5 - 14.5 %    PLATELET 846 018 - 011 K/uL    NEUTROPHILS 62 32 - 75 %    LYMPHOCYTES 27 12 - 49 %    MONOCYTES 9 5 - 13 %    EOSINOPHILS 2 0 - 7 %    BASOPHILS 0 0 - 1 %    ABS. NEUTROPHILS 4.0 1.8 - 8.0 K/UL    ABS. LYMPHOCYTES 1.8 0.8 - 3.5 K/UL    ABS. MONOCYTES 0.6 0.0 - 1.0 K/UL    ABS. EOSINOPHILS 0.1 0.0 - 0.4 K/UL    ABS.  BASOPHILS 0.0 0.0 - 0.1 K/UL   TROPONIN I   Result Value Ref Range    Troponin-I, Qt. <0.04 <0.05 ng/mL   CK W/ CKMB & INDEX   Result Value Ref Range     (H) 26 - 192 U/L    CK - MB 1.2 <3.6 NG/ML    CK-MB Index 0.3 0 - 2.5     PROTHROMBIN TIME + INR   Result Value Ref Range    INR 2.6 (H) 0.9 - 1.1      Prothrombin time 26.5 (H) 9.0 - 11.1 sec   LIPASE   Result Value Ref Range    Lipase 173 73 - 393 U/L   URINALYSIS W/ REFLEX CULTURE    Specimen: Urine   Result Value Ref Range    Color YELLOW/STRAW      Appearance CLEAR CLEAR      Specific gravity 1.020 1.003 - 1.030      pH (UA) 6.0 5.0 - 8.0      Protein NEGATIVE  NEG mg/dL    Glucose NEGATIVE  NEG mg/dL    Ketone NEGATIVE  NEG mg/dL    Bilirubin NEGATIVE  NEG      Blood TRACE (A) NEG      Urobilinogen 0.2 0.2 - 1.0 EU/dL    Nitrites NEGATIVE  NEG      Leukocyte Esterase NEGATIVE  NEG      WBC 0-4 0 - 4 /hpf    RBC 5-10 0 - 5 /hpf    Epithelial cells FEW FEW /lpf    Bacteria 1+ (A) NEG /hpf    UA:UC IF INDICATED URINE CULTURE ORDERED (A) CNI     EKG, 12 LEAD, INITIAL   Result Value Ref Range    Ventricular Rate 67 BPM    Atrial Rate 69 BPM    QRS Duration 86 ms    Q-T Interval 420 ms    QTC Calculation (Bezet) 443 ms    Calculated R Axis 22 degrees    Calculated T Axis 28 degrees    Diagnosis       Atrial fibrillation  No previous ECGs available  Confirmed by Ciara Valle MD, Anny Macias ([id number]) on 6/10/2017 2:44:26 PM          Exam:  There were no vitals taken for this visit. General:  Alert, cooperative, no distress. Head:  Normocephalic, without obvious abnormality, atraumatic. Respiratory:  Heart:   Non labored breathing  deferred   Neck:   defered   Extremities:  no cyanosis or edema. Pulses: deferred. Neurologic:  MS: Alert and awake, speech poor   Cranial Nerves:  II: visual fields deferred   II: pupils Equal, round,.   II: optic disc deferred   III,VII: ptosis none   III,IV,VI: extraocular muscles  EOMI, no nystagmus or diplopia   V: facial light touch sensation  deferred   VII: facial muscle function   symmetric   VIII: hearing intact   IX: soft palate elevation  deferred   XI: trapezius strength   good neck movement   XI: sternocleidomastoid strength  good shrug   XII: tongue  Midline     Motor: normal bulk , no tremor              Strength:  moves all extremities  Coordination: Deferred  Gait: Not assessed  Reflexes:Deferred           Assessment/Plan       ICD-10-CM ICD-9-CM    1. Dementia without behavioral disturbance, unspecified dementia type ([facility]  F03.90 294.20 MRI BRAIN W WO CONT      EEG      CK      PROLACTIN   2. 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 Frequent falls  R29.6 V15.88 MRI BRAIN W WO CONT      ALDOLASE      VITAMIN B12      SED RATE (ESR)   5. Hypovitaminosis  E56.9 269.2 VITAMIN B12      VITAMIN B6      VITAMIN D, 25 HYDROXY   Plan:  I will start [address] for myoclonic epilepsy 400 mg p.o. nightly  MRI of the brain without gadolinium  EEG  Blood for prolactin, CK, aldolase, vitamin B6, vitamin B12, vitamin D. Follow-up and Dispositions    · Return in about 5 weeks (around 3/4/2021).          Thank you very much for this consultation.    .    Audelia Marinelli MD  1/28/2021

## 2023-03-08 NOTE — PROGRESS NOTES
Dr. Munira Damico spoke w/Ms. Ruiz's daughter concerning recommendation for stereo biopsy. Ms. Ingrid Rollins has a history of Alzheimer and daughter is the care giver. Raman Cruz, daughter, spoke w/Dr. Shavon Tao, cardiologist, concerning her Coumadin and advised she can hold it for 3 days prior to biopsy. Raman Cruz states she is familiar with biopsy - I spoke w/Sandra about being present during the biopsy to keep Ms. Ruiz calm because she responds to her daughter well and lays still. Raman Cruz feels she will not have any difficulties w/biopsy. Arranged Tuesday, 2/26 @ 11am, arrival at 1030 for biopsy. [Initial Evaluation] : an initial evaluation

## 2023-05-24 RX ORDER — DONEPEZIL HYDROCHLORIDE 10 MG/1
10 TABLET, FILM COATED ORAL 2 TIMES DAILY
COMMUNITY

## 2023-05-24 RX ORDER — MEMANTINE HYDROCHLORIDE 10 MG/1
10 TABLET ORAL DAILY
COMMUNITY

## 2023-05-24 RX ORDER — MESALAMINE 400 MG/1
CAPSULE, DELAYED RELEASE ORAL 2 TIMES DAILY
COMMUNITY

## 2023-05-24 RX ORDER — LEVETIRACETAM 100 MG/ML
400 SOLUTION ORAL
COMMUNITY
Start: 2021-01-28

## 2023-05-24 RX ORDER — RIVASTIGMINE 9.5 MG/24H
1 PATCH, EXTENDED RELEASE TRANSDERMAL DAILY
COMMUNITY

## 2023-05-24 RX ORDER — ATENOLOL 25 MG/1
25 TABLET ORAL 2 TIMES DAILY
COMMUNITY

## 2023-05-24 RX ORDER — WARFARIN SODIUM 5 MG/1
5 TABLET ORAL DAILY
COMMUNITY

## 2023-05-24 RX ORDER — ESCITALOPRAM OXALATE 20 MG/1
1 TABLET ORAL DAILY
COMMUNITY
Start: 2017-01-06